# Patient Record
Sex: MALE | URBAN - METROPOLITAN AREA
[De-identification: names, ages, dates, MRNs, and addresses within clinical notes are randomized per-mention and may not be internally consistent; named-entity substitution may affect disease eponyms.]

---

## 2020-10-20 ENCOUNTER — APPOINTMENT (RX ONLY)
Dept: URBAN - METROPOLITAN AREA CLINIC 35 | Facility: CLINIC | Age: 50
Setting detail: DERMATOLOGY
End: 2020-10-20

## 2020-10-20 DIAGNOSIS — L82.1 OTHER SEBORRHEIC KERATOSIS: ICD-10-CM

## 2020-10-20 DIAGNOSIS — L91.8 OTHER HYPERTROPHIC DISORDERS OF THE SKIN: ICD-10-CM

## 2020-10-20 DIAGNOSIS — B07.8 OTHER VIRAL WARTS: ICD-10-CM

## 2020-10-20 DIAGNOSIS — L81.4 OTHER MELANIN HYPERPIGMENTATION: ICD-10-CM

## 2020-10-20 DIAGNOSIS — T07XXXA INSECT BITE, NONVENOMOUS, OF OTHER, MULTIPLE, AND UNSPECIFIED SITES, WITHOUT MENTION OF INFECTION: ICD-10-CM

## 2020-10-20 DIAGNOSIS — D22 MELANOCYTIC NEVI: ICD-10-CM

## 2020-10-20 PROBLEM — D22.5 MELANOCYTIC NEVI OF TRUNK: Status: ACTIVE | Noted: 2020-10-20

## 2020-10-20 PROBLEM — S90.861A INSECT BITE (NONVENOMOUS), RIGHT FOOT, INITIAL ENCOUNTER: Status: ACTIVE | Noted: 2020-10-20

## 2020-10-20 PROCEDURE — ? LIQUID NITROGEN

## 2020-10-20 PROCEDURE — ? ADDITIONAL NOTES

## 2020-10-20 PROCEDURE — 99203 OFFICE O/P NEW LOW 30 MIN: CPT | Mod: 25

## 2020-10-20 PROCEDURE — ? DIAGNOSIS COMMENT

## 2020-10-20 PROCEDURE — ? COUNSELING

## 2020-10-20 PROCEDURE — 17110 DESTRUCTION B9 LES UP TO 14: CPT

## 2020-10-20 ASSESSMENT — LOCATION SIMPLE DESCRIPTION DERM
LOCATION SIMPLE: RIGHT INDEX FINGER
LOCATION SIMPLE: RIGHT SHOULDER
LOCATION SIMPLE: RIGHT EYEBROW
LOCATION SIMPLE: RIGHT CHEEK
LOCATION SIMPLE: LEFT SHOULDER
LOCATION SIMPLE: LEFT UPPER BACK
LOCATION SIMPLE: RIGHT FOOT

## 2020-10-20 ASSESSMENT — LOCATION DETAILED DESCRIPTION DERM
LOCATION DETAILED: LEFT ANTERIOR SHOULDER
LOCATION DETAILED: LEFT MEDIAL UPPER BACK
LOCATION DETAILED: RIGHT SUPERIOR LATERAL MALAR CHEEK
LOCATION DETAILED: RIGHT CENTRAL EYEBROW
LOCATION DETAILED: RIGHT ANTERIOR SHOULDER
LOCATION DETAILED: RIGHT MID DORSAL INDEX FINGER
LOCATION DETAILED: RIGHT DORSAL FOOT

## 2020-10-20 ASSESSMENT — LOCATION ZONE DERM
LOCATION ZONE: FINGER
LOCATION ZONE: FACE
LOCATION ZONE: ARM
LOCATION ZONE: TRUNK
LOCATION ZONE: FEET

## 2020-10-20 NOTE — PROCEDURE: ADDITIONAL NOTES
Additional Notes: Discussed gradle scissor removal as desired by patient (cosmetic)
Detail Level: Simple

## 2020-10-20 NOTE — PROCEDURE: LIQUID NITROGEN
Post-Care Instructions: I reviewed with the patient in detail post-care instructions. Patient is to wear sunprotection, and avoid picking at any of the treated lesions. Pt may apply Vaseline to crusted or scabbing areas.
Number Of Freeze-Thaw Cycles: 2 freeze-thaw cycles
Medical Necessity Information: It is in your best interest to select a reason for this procedure from the list below. All of these items fulfill various CMS LCD requirements except the new and changing color options.
Render Post-Care Instructions In Note?: no
Consent: The patient's consent was obtained including but not limited to risks of crusting, scabbing, blistering, scarring, darker or lighter pigmentary change, recurrence, incomplete removal and infection.
Detail Level: Detailed
Medical Necessity Clause: This procedure was medically necessary because the lesions that were treated were:

## 2020-10-20 NOTE — PROCEDURE: DIAGNOSIS COMMENT
Detail Level: Simple
Comment: Trial of LN2 provided today to lesion on the eyelid; if tolerates well, consider LN2 vs. gradle excision for residual acrochodrons \\nBody Locations:\\nright lateral superior eyelid\\nProcedure Note:\\nA total of 1 lesion was treated with liquid nitrogen, located on the right lateral superior eyelid. The patient's consent was obtained including but not limited to risks of crusting, scabbing, blistering, scarring, darker or lighter pigmentary change, recurrence, incomplete removal and infection. I reviewed with the patient in detail post-care instructions. Patient is to wear sunprotection, and avoid picking at any of the treated lesions. Pt may apply Vaseline to crusted or scabbing areas.

## 2020-10-20 NOTE — PROCEDURE: COUNSELING
Detail Level: Zone
Detail Level: Detailed
Patient Specific Counseling (Will Not Stick From Patient To Patient): Declined rx for topical steroids
Detail Level: Generalized
Patient Specific Counseling (Will Not Stick From Patient To Patient): To purchase Wartstick and use nightly

## 2020-12-02 ENCOUNTER — APPOINTMENT (RX ONLY)
Dept: URBAN - METROPOLITAN AREA CLINIC 35 | Facility: CLINIC | Age: 50
Setting detail: DERMATOLOGY
End: 2020-12-02

## 2020-12-02 DIAGNOSIS — B07.8 OTHER VIRAL WARTS: ICD-10-CM

## 2020-12-02 DIAGNOSIS — L91.8 OTHER HYPERTROPHIC DISORDERS OF THE SKIN: ICD-10-CM

## 2020-12-02 DIAGNOSIS — L82.1 OTHER SEBORRHEIC KERATOSIS: ICD-10-CM

## 2020-12-02 PROCEDURE — 99213 OFFICE O/P EST LOW 20 MIN: CPT | Mod: 25

## 2020-12-02 PROCEDURE — 17110 DESTRUCTION B9 LES UP TO 14: CPT

## 2020-12-02 PROCEDURE — ? SKIN TAG REMOVAL (COSMETIC)

## 2020-12-02 PROCEDURE — ? LIQUID NITROGEN

## 2020-12-02 PROCEDURE — ? DIAGNOSIS COMMENT

## 2020-12-02 PROCEDURE — ? COUNSELING

## 2020-12-02 ASSESSMENT — LOCATION DETAILED DESCRIPTION DERM
LOCATION DETAILED: RIGHT MID DORSAL INDEX FINGER
LOCATION DETAILED: RIGHT LATERAL SUPERIOR EYELID
LOCATION DETAILED: RIGHT MEDIAL SUPERIOR EYELID
LOCATION DETAILED: LEFT SUPERIOR LATERAL MALAR CHEEK
LOCATION DETAILED: RIGHT SUPERIOR CENTRAL MALAR CHEEK
LOCATION DETAILED: RIGHT SUPERIOR MEDIAL MALAR CHEEK
LOCATION DETAILED: LEFT MEDIAL EYEBROW
LOCATION DETAILED: LEFT LATERAL EYEBROW
LOCATION DETAILED: LEFT SUPERIOR CENTRAL MALAR CHEEK
LOCATION DETAILED: LEFT INFERIOR FOREHEAD
LOCATION DETAILED: LEFT INFERIOR TEMPLE
LOCATION DETAILED: RIGHT INFERIOR TEMPLE
LOCATION DETAILED: RIGHT INFERIOR FOREHEAD
LOCATION DETAILED: RIGHT LATERAL EYEBROW

## 2020-12-02 ASSESSMENT — LOCATION SIMPLE DESCRIPTION DERM
LOCATION SIMPLE: LEFT TEMPLE
LOCATION SIMPLE: LEFT EYEBROW
LOCATION SIMPLE: RIGHT FOREHEAD
LOCATION SIMPLE: RIGHT EYEBROW
LOCATION SIMPLE: RIGHT TEMPLE
LOCATION SIMPLE: RIGHT SUPERIOR EYELID
LOCATION SIMPLE: LEFT CHEEK
LOCATION SIMPLE: LEFT FOREHEAD
LOCATION SIMPLE: RIGHT INDEX FINGER
LOCATION SIMPLE: RIGHT CHEEK

## 2020-12-02 ASSESSMENT — LOCATION ZONE DERM
LOCATION ZONE: FINGER
LOCATION ZONE: EYELID
LOCATION ZONE: FACE

## 2020-12-02 NOTE — PROCEDURE: COUNSELING
Detail Level: Detailed
Patient Specific Counseling (Will Not Stick From Patient To Patient): To purchase Wartstick and use nightly
Detail Level: Zone

## 2020-12-02 NOTE — PROCEDURE: DIAGNOSIS COMMENT
Detail Level: Simple
Comment: 1 lesion treated at last visit for trial, well healed with no evidence of scarring or pigmentation loss. Opted for grade excision today of pedunculated lesions and cautery of more flat lesions; cosmetic charge $150 for 15 lesions.
Comment: Treated with LN2 for second treatment today. Discussed daily sal acid regimen at home would further treatment, pt declined and cited noncompliance.

## 2020-12-02 NOTE — PROCEDURE: SKIN TAG REMOVAL (COSMETIC)
Price (Use Numbers Only, No Special Characters Or $): 150
Removed With: gradle excision
Detail Level: Detailed
Anesthesia Volume In Cc: 1.2
Consent: Written consent obtained and the risks of skin tag removal was reviewed with the patient including but not limited to bleeding, pigmentary change, infection, pain, and remote possibility of scarring.
Anesthesia Type: 1% lidocaine with epinephrine

## 2021-03-19 ENCOUNTER — APPOINTMENT (RX ONLY)
Dept: URBAN - METROPOLITAN AREA CLINIC 35 | Facility: CLINIC | Age: 51
Setting detail: DERMATOLOGY
End: 2021-03-19

## 2021-03-19 DIAGNOSIS — B07.8 OTHER VIRAL WARTS: ICD-10-CM

## 2021-03-19 PROCEDURE — ? LIQUID NITROGEN

## 2021-03-19 PROCEDURE — ? TREATMENT REGIMEN

## 2021-03-19 PROCEDURE — ? COUNSELING

## 2021-03-19 PROCEDURE — 17110 DESTRUCTION B9 LES UP TO 14: CPT

## 2021-03-19 ASSESSMENT — LOCATION ZONE DERM: LOCATION ZONE: FINGER

## 2021-03-19 ASSESSMENT — LOCATION DETAILED DESCRIPTION DERM: LOCATION DETAILED: RIGHT MID DORSAL INDEX FINGER

## 2021-03-19 ASSESSMENT — LOCATION SIMPLE DESCRIPTION DERM: LOCATION SIMPLE: RIGHT INDEX FINGER

## 2021-03-19 NOTE — PROCEDURE: TREATMENT REGIMEN
Initiate Treatment: - one week after today’s visit start Dr Myers’s as per instructions.
Detail Level: Zone

## 2021-03-19 NOTE — PROCEDURE: COUNSELING
Detail Level: Detailed
Patient Specific Counseling (Will Not Stick From Patient To Patient): To purchase Wartstick and use nightly

## 2021-12-30 ENCOUNTER — APPOINTMENT (RX ONLY)
Dept: URBAN - METROPOLITAN AREA CLINIC 35 | Facility: CLINIC | Age: 51
Setting detail: DERMATOLOGY
End: 2021-12-30

## 2021-12-30 DIAGNOSIS — L81.4 OTHER MELANIN HYPERPIGMENTATION: ICD-10-CM

## 2021-12-30 DIAGNOSIS — L82.1 OTHER SEBORRHEIC KERATOSIS: ICD-10-CM

## 2021-12-30 DIAGNOSIS — L73.8 OTHER SPECIFIED FOLLICULAR DISORDERS: ICD-10-CM

## 2021-12-30 DIAGNOSIS — D22 MELANOCYTIC NEVI: ICD-10-CM

## 2021-12-30 DIAGNOSIS — B07.8 OTHER VIRAL WARTS: ICD-10-CM

## 2021-12-30 PROBLEM — D22.5 MELANOCYTIC NEVI OF TRUNK: Status: ACTIVE | Noted: 2021-12-30

## 2021-12-30 PROCEDURE — ? COUNSELING

## 2021-12-30 PROCEDURE — ? PRESCRIPTION MEDICATION MANAGEMENT

## 2021-12-30 PROCEDURE — 99213 OFFICE O/P EST LOW 20 MIN: CPT | Mod: 25

## 2021-12-30 PROCEDURE — ? LIQUID NITROGEN

## 2021-12-30 PROCEDURE — 17110 DESTRUCTION B9 LES UP TO 14: CPT

## 2021-12-30 ASSESSMENT — LOCATION DETAILED DESCRIPTION DERM
LOCATION DETAILED: LEFT SUPERIOR FOREHEAD
LOCATION DETAILED: RIGHT ANTERIOR SHOULDER
LOCATION DETAILED: RIGHT SUPERIOR MEDIAL MALAR CHEEK
LOCATION DETAILED: LEFT MEDIAL UPPER BACK
LOCATION DETAILED: RIGHT MID DORSAL INDEX FINGER
LOCATION DETAILED: RIGHT SUPERIOR CENTRAL MALAR CHEEK
LOCATION DETAILED: RIGHT FOREHEAD
LOCATION DETAILED: LEFT ANTERIOR SHOULDER

## 2021-12-30 ASSESSMENT — LOCATION SIMPLE DESCRIPTION DERM
LOCATION SIMPLE: LEFT SHOULDER
LOCATION SIMPLE: RIGHT FOREHEAD
LOCATION SIMPLE: LEFT FOREHEAD
LOCATION SIMPLE: RIGHT CHEEK
LOCATION SIMPLE: LEFT UPPER BACK
LOCATION SIMPLE: RIGHT SHOULDER
LOCATION SIMPLE: RIGHT INDEX FINGER

## 2021-12-30 ASSESSMENT — LOCATION ZONE DERM
LOCATION ZONE: FACE
LOCATION ZONE: ARM
LOCATION ZONE: TRUNK
LOCATION ZONE: FINGER

## 2021-12-30 NOTE — PROCEDURE: PRESCRIPTION MEDICATION MANAGEMENT
Initiate Treatment: 1. Before bedtime, soak the wart 10 minutes in tepid water and pat dry with a towel. You may substitute a shower or bath for this soak.\\n2. Carefully apply a small amount of Dr Jasmine wart remover to the surface of the wart with a toothpick.\\n3. Cover the wart with adhesive tape.\\n4. Leave the wart covered overnight.\\n5. Wash off the wart in the morning with soap and water. The war, and any other skin the medicine touches, with turn soft and white.\\n6. Repeat steps 1-5 every night for 2 weeks\\n7. File off any dead white skin on the seventh morning with a corn file,emery board, or pumice stone after the wart is washed.\\n8. Repeats steps 1-7 for a second week. Do not skip any night of application\\n9. Wait one week for the skin to heal without treatment.\\n10. If the wart is not gone, repeat steps 1-9 for 3 more cycles if necessary.
Render In Strict Bullet Format?: No
Detail Level: Zone

## 2021-12-30 NOTE — HPI: EVALUATION OF SKIN LESION(S)
What Type Of Note Output Would You Prefer (Optional)?: Standard Output
Hpi Title: Evaluation of Skin Lesions
How Severe Are Your Spot(S)?: moderate
Have Your Spot(S) Been Treated In The Past?: has not been treated
Additional History: No spots of concern.

## 2021-12-30 NOTE — PROCEDURE: COUNSELING
Detail Level: Detailed
Patient Specific Counseling (Will Not Stick From Patient To Patient): Rec Dr. Myers's or duct tape
Detail Level: Zone
Detail Level: Generalized